# Patient Record
Sex: FEMALE | Race: WHITE | NOT HISPANIC OR LATINO | Employment: OTHER | URBAN - METROPOLITAN AREA
[De-identification: names, ages, dates, MRNs, and addresses within clinical notes are randomized per-mention and may not be internally consistent; named-entity substitution may affect disease eponyms.]

---

## 2019-08-15 ENCOUNTER — NEW PATIENT COMPREHENSIVE (OUTPATIENT)
Dept: URBAN - METROPOLITAN AREA CLINIC 46 | Facility: CLINIC | Age: 79
End: 2019-08-15

## 2019-08-15 DIAGNOSIS — H25.813: ICD-10-CM

## 2019-08-15 DIAGNOSIS — H40.023: ICD-10-CM

## 2019-08-15 PROCEDURE — 92015 DETERMINE REFRACTIVE STATE: CPT

## 2019-08-15 PROCEDURE — 92004 COMPRE OPH EXAM NEW PT 1/>: CPT

## 2019-08-15 ASSESSMENT — VISUAL ACUITY
OD_CC: J2
OS_CC: J2
OS_SC: J12
OS_PH: 20/30
OD_SC: J12
OD_CC: 20/30-2
OD_PH: 20/40-1
OS_CC: 20/30-2
OS_SC: 20/40-2
OD_SC: 20/70-1

## 2019-08-15 ASSESSMENT — TONOMETRY
OS_IOP_MMHG: 20
OD_IOP_MMHG: 16

## 2022-11-02 NOTE — PATIENT DISCUSSION
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 still with some NPDR.  send back in [facility].